# Patient Record
Sex: MALE | Race: WHITE | Employment: FULL TIME | URBAN - NONMETROPOLITAN AREA
[De-identification: names, ages, dates, MRNs, and addresses within clinical notes are randomized per-mention and may not be internally consistent; named-entity substitution may affect disease eponyms.]

---

## 2021-10-16 ENCOUNTER — OFFICE VISIT (OUTPATIENT)
Dept: URGENT CARE | Age: 41
End: 2021-10-16
Payer: COMMERCIAL

## 2021-10-16 VITALS
OXYGEN SATURATION: 99 % | WEIGHT: 190.6 LBS | HEIGHT: 69 IN | HEART RATE: 70 BPM | DIASTOLIC BLOOD PRESSURE: 79 MMHG | BODY MASS INDEX: 28.23 KG/M2 | TEMPERATURE: 98.2 F | SYSTOLIC BLOOD PRESSURE: 129 MMHG

## 2021-10-16 DIAGNOSIS — H00.011 HORDEOLUM OF RIGHT UPPER EYELID, UNSPECIFIED HORDEOLUM TYPE: Primary | ICD-10-CM

## 2021-10-16 PROCEDURE — 99203 OFFICE O/P NEW LOW 30 MIN: CPT | Performed by: NURSE PRACTITIONER

## 2021-10-16 RX ORDER — MULTIVIT-MIN/IRON FUM/FOLIC AC 7.5 MG-4
1 TABLET ORAL DAILY
COMMUNITY
End: 2022-08-22

## 2021-10-16 RX ORDER — CEPHALEXIN 500 MG/1
500 CAPSULE ORAL 3 TIMES DAILY
Qty: 21 CAPSULE | Refills: 0 | Status: SHIPPED | OUTPATIENT
Start: 2021-10-16 | End: 2021-10-23

## 2021-10-16 RX ORDER — MULTIVIT-MIN/IRON/FOLIC ACID/K 18-600-40
CAPSULE ORAL
COMMUNITY
End: 2022-08-22

## 2021-10-16 ASSESSMENT — ENCOUNTER SYMPTOMS
EYE DISCHARGE: 0
EYE PAIN: 1
EYE ITCHING: 0

## 2021-10-16 ASSESSMENT — PATIENT HEALTH QUESTIONNAIRE - PHQ9
SUM OF ALL RESPONSES TO PHQ QUESTIONS 1-9: 0
2. FEELING DOWN, DEPRESSED OR HOPELESS: 0
1. LITTLE INTEREST OR PLEASURE IN DOING THINGS: 0
SUM OF ALL RESPONSES TO PHQ9 QUESTIONS 1 & 2: 0
SUM OF ALL RESPONSES TO PHQ QUESTIONS 1-9: 0
SUM OF ALL RESPONSES TO PHQ QUESTIONS 1-9: 0

## 2021-10-16 NOTE — PATIENT INSTRUCTIONS
Patient Education        Styes and Chalazia: Care Instructions  Your Care Instructions     Styes and chalazia (say \"xcc-JKZ-lct-uh\") are both conditions that can cause swelling of the eyelid. A stye is an infection in the root of an eyelash. The infection causes a tender red lump on the edge of the eyelid. The infection can spread until the whole eyelid becomes red and inflamed. Styes usually break open, and a tiny amount of pus drains. They usually clear up on their own in about a week, but they sometimes need treatment with antibiotics. A chalazion is a lump or cyst in the eyelid (chalazion is singular; chalazia is plural). It is caused by swelling and inflammation of deep oil glands inside the eyelid. Chalazia are usually not infected. They can take a few months to heal.  If a chalazion becomes more swollen and painful or does not go away, you may need to have it drained by your doctor. Follow-up care is a key part of your treatment and safety. Be sure to make and go to all appointments, and call your doctor if you are having problems. It's also a good idea to know your test results and keep a list of the medicines you take. How can you care for yourself at home? · Do not rub your eyes. Do not squeeze or try to open a stye or chalazion. · To help a stye or chalazion heal faster:  ? Put a warm, moist compress on your eye for 5 to 10 minutes, 3 to 6 times a day. Heat often brings a stye to a point where it drains on its own. Keep in mind that warm compresses will often increase swelling a little at first.  ? Do not use hot water or heat a wet cloth in a microwave oven. The compress may get too hot and can burn the eyelid. · Always wash your hands before and after you use a compress or touch your eyes. · If the doctor gave you antibiotic drops or ointment, use the medicine exactly as directed. Use the medicine for as long as instructed, even if your eye starts to feel better.   · To put in eyedrops or ointment:  ? Tilt your head back, and pull your lower eyelid down with one finger. ? Drop or squirt the medicine inside the lower lid. ? Close your eye for 30 to 60 seconds to let the drops or ointment move around. ? Do not touch the ointment or dropper tip to your eyelashes or any other surface. · Do not wear eye makeup or contact lenses until the stye or chalazion heals. · Do not share towels, pillows, or washcloths while you have a stye. When should you call for help? Call your doctor now or seek immediate medical care if:    · You have pain in your eye.     · You have a change in vision or loss of vision.     · Redness and swelling get much worse. Watch closely for changes in your health, and be sure to contact your doctor if:    · Your stye does not get better in 1 week.     · Your chalazion does not start to get better after several weeks. Where can you learn more? Go to https://Star[employer].Petenko. org and sign in to your C[employer] account. Enter J971 in the Infotop box to learn more about \"Styes and Chalazia: Care Instructions. \"     If you do not have an account, please click on the \"Sign Up Now\" link. Current as of: April 29, 2021               Content Version: 13.0  © 6142-3533 Healthwise, Incorporated. Care instructions adapted under license by [facility] ([facility]). If you have questions about a medical condition or this instruction, always ask your healthcare professional. [place] [address] any warranty or liability for your use of this information. 1. Antibiotic as prescribed   2. Apply a warm washcloth several times a day to eye  3. Get established with a PCP - list of providers within [facility]) provided   4. If patient is not improving or developing any new/worsening symptoms then return to clinic as needed.

## 2021-10-16 NOTE — PROGRESS NOTES
[address] [facility]  [address] [facility] [address] [id number]  Dept: [phone]  Dept Fax: [phone]  Loc: [phone]    Jackelyn Shabazz is a 37 y.o. male who presents today for his medical conditions/complaintsas noted below. Jackelyn Shabazz is c/o of Foreign Body in [place] (got something in right eye, is now swollen and irritated )        HPI:     HPI  Andrae Jovel presents today with right upper eye lid irriated. He thinks he may have gotten something it in on Wednesday. He has a history of styes. It has gotten much worse. He tried flushing it in the shower this morning and felt a sharp pain and the more he flushed the pain got better. No fever. No changes in vision. No drainage. He has already tried cool compresses and it has not gotten better. He states historically when he gets spots like these they get better on their own, but his one is not getting better. History reviewed. No pertinent past medical history. History reviewed. No pertinent surgical history. History reviewed. No pertinent family history. Social History     Tobacco Use    Smoking status: Former Smoker     Packs/day: 1.00     Years: 10.00     Pack years: 10.00     Types: Cigarettes     Quit date:      Years since quittin.    Smokeless tobacco: Never Used   Substance Use Topics    Alcohol use: Yes     Comment: occ. Current Outpatient Medications   Medication Sig Dispense Refill    Cholecalciferol (VITAMIN D) 50 MCG (2000) CAPS capsule Take by mouth      Multiple Vitamins-Minerals (MULTIVITAMIN WITH MINERALS) tablet Take 1 tablet by mouth daily      cephALEXin (KEFLEX) 500 MG capsule Take 1 capsule by mouth 3 times daily for 7 days 21 capsule 0     No current facility-administered medications for this visit.      No Known Allergies    Health Maintenance   Topic Date Due    Hepatitis C screen  Never done    Varicella vaccine (1 of 2 - 2-dose childhood series) Never done    COVID-19 Vaccine (1) Never done    HIV screen  Never done    DTaP/Tdap/Td vaccine (1 - Tdap) Never done    Lipid screen  Never done    Diabetes screen  Never done    Flu vaccine (1) Never done    Hepatitis A vaccine  Aged Out    Hepatitis B vaccine  Aged Out    Hib vaccine  Aged Out    Meningococcal (ACWY) vaccine  Aged Out    Pneumococcal 0-64 years Vaccine  Aged Out       Subjective:     Review of Systems   Constitutional: Negative for chills and fever. Eyes: Positive for pain (right upper lid). Negative for discharge, itching and visual disturbance. All other systems reviewed and are negative.      :Objective      Physical Exam  Vitals and nursing note reviewed. Constitutional:       Appearance: Normal appearance. He is not ill-appearing. HENT:      Head: Normocephalic and atraumatic. Eyes:      General:         Right eye: Hordeolum present. Conjunctiva/sclera: Conjunctivae normal.      Right eye: Right conjunctiva is not injected. Pupils: Pupils are equal, round, and reactive to light. Skin:     General: Skin is warm and dry. Neurological:      Mental Status: He is alert and oriented to person, place, and time. Psychiatric:         Mood and Affect: Mood normal.         Behavior: Behavior normal.       /79   Pulse 70   Temp 98.2 °F (36.8 °C)   Ht 5' 9\" (1.753 m)   Wt 190 lb 9.6 oz (86.5 kg)   SpO2 99%   BMI 28.15 kg/m²     :Assessment       Diagnosis Orders   1. Hordeolum of right upper eyelid, unspecified hordeolum type  cephALEXin (KEFLEX) 500 MG capsule       :Plan   1. Antibiotic as prescribed   2. Apply a warm washcloth several times a day to eye  3. Get established with a PCP - list of providers within [facility]) provided   4. If patient is not improving or developing any new/worsening symptoms then return to clinic as needed. No orders of the defined types were placed in this encounter. No follow-ups on file.     Orders Placed This Encounter   Medications  cephALEXin (KEFLEX) 500 MG capsule     Sig: Take 1 capsule by mouth 3 times daily for 7 days     Dispense:  21 capsule     Refill:  0       Patient given educational materials- see patient instructions. Discussed use, benefit, and side effects of prescribedmedications. All patient questions answered. Pt voiced understanding. Patient Instructions       Patient Education        Styes and Chalazia: Care Instructions  Your Care Instructions     Styes and chalazia (say \"xgi-WXA-fjz-\") are both conditions that can cause swelling of the eyelid. A stye is an infection in the root of an eyelash. The infection causes a tender red lump on the edge of the eyelid. The infection can spread until the whole eyelid becomes red and inflamed. Styes usually break open, and a tiny amount of pus drains. They usually clear up on their own in about a week, but they sometimes need treatment with antibiotics. A chalazion is a lump or cyst in the eyelid (chalazion is singular; chalazia is plural). It is caused by swelling and inflammation of deep oil glands inside the eyelid. Chalazia are usually not infected. They can take a few months to heal.  If a chalazion becomes more swollen and painful or does not go away, you may need to have it drained by your doctor. Follow-up care is a key part of your treatment and safety. Be sure to make and go to all appointments, and call your doctor if you are having problems. It's also a good idea to know your test results and keep a list of the medicines you take. How can you care for yourself at home? · Do not rub your eyes. Do not squeeze or try to open a stye or chalazion. · To help a stye or chalazion heal faster:  ? Put a warm, moist compress on your eye for 5 to 10 minutes, 3 to 6 times a day. Heat often brings a stye to a point where it drains on its own.  Keep in mind that warm compresses will often increase swelling a little at first.  ? Do not use hot water or heat a wet cloth in a microwave oven. The compress may get too hot and can burn the eyelid. · Always wash your hands before and after you use a compress or touch your eyes. · If the doctor gave you antibiotic drops or ointment, use the medicine exactly as directed. Use the medicine for as long as instructed, even if your eye starts to feel better. · To put in eyedrops or ointment:  ? Tilt your head back, and pull your lower eyelid down with one finger. ? Drop or squirt the medicine inside the lower lid. ? Close your eye for 30 to 60 seconds to let the drops or ointment move around. ? Do not touch the ointment or dropper tip to your eyelashes or any other surface. · Do not wear eye makeup or contact lenses until the stye or chalazion heals. · Do not share towels, pillows, or washcloths while you have a stye. When should you call for help? Call your doctor now or seek immediate medical care if:    · You have pain in your eye.     · You have a change in vision or loss of vision.     · Redness and swelling get much worse. Watch closely for changes in your health, and be sure to contact your doctor if:    · Your stye does not get better in 1 week.     · Your chalazion does not start to get better after several weeks. Where can you learn more? Go to https://VasSolpedaisyeweb.Nuggeta. org and sign in to your Zursh account. Enter U603 in the [facility] box to learn more about \"Styes and Chalazia: Care Instructions. \"     If you do not have an account, please click on the \"Sign Up Now\" link. Current as of: April 29, 2021               Content Version: 13.0  © 0030-3616 Healthwise, Incorporated. Care instructions adapted under license by [facility] ([facility]). If you have questions about a medical condition or this instruction, always ask your healthcare professional. [address] any warranty or liability for your use of this information. 1. Antibiotic as prescribed   2.  Apply a warm washcloth several times a day to eye  3. Get established with a PCP - list of providers within [facility]) provided   4. If patient is not improving or developing any new/worsening symptoms then return to clinic as needed.          Electronically signed by LAKSHMI Kessler on 10/16/2021 at 12:31 PM

## 2022-08-11 ENCOUNTER — OFFICE VISIT (OUTPATIENT)
Age: 42
End: 2022-08-11
Payer: COMMERCIAL

## 2022-08-11 VITALS
RESPIRATION RATE: 18 BRPM | DIASTOLIC BLOOD PRESSURE: 88 MMHG | WEIGHT: 190.8 LBS | SYSTOLIC BLOOD PRESSURE: 132 MMHG | OXYGEN SATURATION: 97 % | TEMPERATURE: 97.7 F | HEIGHT: 69 IN | HEART RATE: 58 BPM | BODY MASS INDEX: 28.26 KG/M2

## 2022-08-11 DIAGNOSIS — R10.32 LLQ PAIN: Primary | ICD-10-CM

## 2022-08-11 PROCEDURE — 99213 OFFICE O/P EST LOW 20 MIN: CPT

## 2022-08-11 ASSESSMENT — ENCOUNTER SYMPTOMS
ABDOMINAL PAIN: 1
DIARRHEA: 0
CONSTIPATION: 0
BLOOD IN STOOL: 0

## 2022-08-11 NOTE — PROGRESS NOTES
[address]  [address] [facility] [address] [address]  Dept: [phone]  Dept Fax: [phone]  Loc: [phone]    Lennox Carrel is a 37 y.o. male who presents today for his medical conditions/complaints as noted below. Lennox Carrel is c/o of Abdominal Pain (Left lower quadrant pain.) and Other (Pt states that he feels a \"lump\" in his left lower quadrant.)        HPI:     Abdominal Pain  Pertinent negatives include no constipation, diarrhea or fever. Other  Associated symptoms include abdominal pain. Pertinent negatives include no fever. Cherylene Leas presents with complaints of bulge and stabbing pain to left groin that is intermittent for 1 week. He states he is able to push the bulge back in but works 3 jobs and is moving so it is frequent. OTC treatment includes stretches and trying to strengthen core. Denies constipation and blood in his stool. He denies urinary symptoms. The pain is worse when straining for BM or when he has gas. He denies bulging in the scrotum and testicular pain. History reviewed. No pertinent past medical history. No past surgical history on file. No family history on file. Social History     Tobacco Use    Smoking status: Former     Packs/day: 1.00     Years: 10.00     Pack years: 10.00     Types: Cigarettes     Quit date: 2012     Years since quitting: 10.6    Smokeless tobacco: Never   Substance Use Topics    Alcohol use: Yes     Comment: occ. Current Outpatient Medications   Medication Sig Dispense Refill    Cholecalciferol (VITAMIN D) 50 MCG (2000 UT) CAPS capsule Take by mouth (Patient not taking: Reported on 8/11/2022)      Multiple Vitamins-Minerals (MULTIVITAMIN WITH MINERALS) tablet Take 1 tablet by mouth daily (Patient not taking: Reported on 8/11/2022)       No current facility-administered medications for this visit.      No Known Allergies    Health Maintenance   Topic Date Due    COVID-19 Vaccine (1) Never done    Varicella vaccine (1 of 2 - 2-dose childhood series) Never done    HIV screen  Never done    Hepatitis C screen  Never done    DTaP/Tdap/Td vaccine (1 - Tdap) Never done    Diabetes screen  Never done    Lipids  Never done    Flu vaccine (1) 09/01/2022    Depression Screen  10/16/2022    Hepatitis A vaccine  Aged Out    Hepatitis B vaccine  Aged Out    Hib vaccine  Aged Out    Meningococcal (ACWY) vaccine  Aged Out    Pneumococcal 0-64 years Vaccine  Aged Out       Subjective:     Review of Systems   Constitutional:  Negative for fever. Gastrointestinal:  Positive for abdominal pain. Negative for blood in stool, constipation and diarrhea. Genitourinary:  Negative for scrotal swelling.     :Objective      Physical Exam  Constitutional:       General: He is not in acute distress. Appearance: Normal appearance. He is not toxic-appearing. HENT:      Head: Normocephalic and atraumatic. Right Ear: External ear normal.      Left Ear: External ear normal.      Nose: Nose normal.      Mouth/Throat:      Mouth: Mucous membranes are moist.   Eyes:      General:         Right eye: No discharge. Left eye: No discharge. Conjunctiva/sclera: Conjunctivae normal.   Cardiovascular:      Rate and Rhythm: Normal rate and regular rhythm. Pulmonary:      Effort: Pulmonary effort is normal. No respiratory distress. Abdominal:      General: Abdomen is flat. Bowel sounds are normal.      Palpations: Abdomen is soft. Tenderness: There is no abdominal tenderness. Musculoskeletal:         General: Normal range of motion. Cervical back: Normal range of motion. Lymphadenopathy:      Cervical: No cervical adenopathy. Skin:     General: Skin is warm and dry. Capillary Refill: Capillary refill takes less than 2 seconds. Findings: No rash. Neurological:      General: No focal deficit present. Mental Status: He is alert.    Psychiatric:         Mood and Affect: Mood

## 2022-08-11 NOTE — PATIENT INSTRUCTIONS
Avoid straining and lifting anything heavier than 10 lb for the time being  If you develop severe pain, fever or are unable to have BM - go to the ER  Have ultrasound completed tomorrow- results will be called to you  Expect call from general surgery office for appt

## 2022-08-12 ENCOUNTER — HOSPITAL ENCOUNTER (OUTPATIENT)
Dept: ULTRASOUND IMAGING | Age: 42
Discharge: HOME | End: 2022-08-12
Payer: COMMERCIAL

## 2022-08-12 DIAGNOSIS — R10.32 LLQ PAIN: ICD-10-CM

## 2022-08-12 PROCEDURE — 76705 ECHO EXAM OF ABDOMEN: CPT

## 2022-08-12 PROCEDURE — 76705 ECHO EXAM OF ABDOMEN: CPT | Performed by: RADIOLOGY

## 2022-08-15 ENCOUNTER — OFFICE VISIT (OUTPATIENT)
Dept: SURGERY | Age: 42
End: 2022-08-15
Payer: COMMERCIAL

## 2022-08-15 VITALS — HEIGHT: 69 IN | TEMPERATURE: 97.6 F | WEIGHT: 188 LBS | BODY MASS INDEX: 27.85 KG/M2

## 2022-08-15 DIAGNOSIS — K40.90 LEFT INGUINAL HERNIA: Primary | ICD-10-CM

## 2022-08-15 PROCEDURE — 99203 OFFICE O/P NEW LOW 30 MIN: CPT | Performed by: PHYSICIAN ASSISTANT

## 2022-08-15 NOTE — LETTER
Preop Orders:     Patient: Chester Liu  : 1980    Hospital:  [address] [facility]      Admitting Physician:        Diagnosis:   Left Inguinal Hernia    Procedure:  Robotic Left Inguinal Hernia      Anesthesia: General    Admission:Outpatient    Date: next available    Labs:per anesthesia      Pre-Op Meds:  Kefzol 2grm IV      Latex Allergy: no    Beta Blocker: no    Medication Instructions: No plavix for 2 weeks prior to procedure; no asprin for 3 days prior to procedure    This has been electronically signed by PHILLIP Monsalve

## 2022-08-15 NOTE — PROGRESS NOTES
HISTORY OF PRESENT ILLNESS:  Néstor Espinosa III is a 40 y.o. male comes today with a left inguinal hernia. The hernia occurred at work. He has noticed some mild to moderate pain and a questionable bulge. He denies strangulation or incarceration. Current Outpatient Medications   Medication Sig Dispense Refill    Multiple Vitamin (MULTIVITAMIN ADULT) TABS Take 1 tablet by mouth daily      Cholecalciferol (VITAMIN D3) 50 MCG (2000 UT) CAPS Take 1 capsule by mouth daily       No current facility-administered medications for this visit. Allergies: Patient has no known allergies. No past medical history on file. Past Surgical History:   Procedure Laterality Date    TONSILLECTOMY       No family history on file. Social History     Tobacco Use    Smoking status: Former     Packs/day: 1.00     Years: 10.00     Pack years: 10.00     Types: Cigarettes     Quit date: 2012     Years since quitting: 10.6    Smokeless tobacco: Never   Substance Use Topics    Alcohol use: Yes     Comment: occ. Review of Systems  Reviewed and positive for the above all this is noted be negative    Physical Exam  Temperature 97.6 °F (36.4 °C), height 5' 9\" (1.753 m), weight 188 lb (85.3 kg). GENERAL:  Reveals a 40 y.o. male that  appears to be in no acute distress. HEENT:  Head is normocephalic and atraumatic. NECK:  Neck is supple. CHEST:  Patient has normal respiratory effort. Chest is clear bilaterally with good thoracic expansion. HEART:  Heart demonstrated a regular rhythm and rate with no cardiac murmurs, gallops or rubs noted to auscultation. ABDOMEN:  Inspection of the abdomen demonstrated the patient to have normal bowel sounds present. The abdomen is soft and nontender. GROIN:  He has a reducible left inguinal hernia. There is mild tenderness. EXTREMITIES:  Extremities demonstrated no cyanosis or pitting edema bilaterally.     PSYCHIATRIC:  Patient is oriented to time, place and person. The patient's mood and affect are normal.      IMPRESSION:  Left inguinal hernia    PLAN:  The risks, benefits, and options were discussed with the patient. He  is willing to proceed with surgery.

## 2022-08-22 ENCOUNTER — HOSPITAL ENCOUNTER (OUTPATIENT)
Dept: PREADMISSION TESTING | Age: 42
Discharge: HOME | End: 2022-08-26

## 2022-08-22 NOTE — DISCHARGE INSTRUCTIONS
The day before your surgery, you will receive a phone call from the surgery nurse, to let you know what time to arrive on the day of surgery. This call will usually be between 2-4 PM.  If you do not receive a phone call by 4 PM the day before your surgery, please call [phone] and let them know you have not received an arrival time. If your surgery is on Monday, your call will be on the Friday before your Monday surgery. CHLORHEXIDINE GLUCONATE 4% SHOWERING    Patient should shower with this soap the night before surgery and the morning of surgery washing from the neck down (avoiding contact with genitalia). DO NOT [address] YOUR HAIR OR FACE WITH THIS SOAP. When washing with this soap, apply enough to suds up the body thoroughly, turn the water away from your body and allow the soap suds to remain on the body for 2 full minutes, then rinse body completely. After using this soap on the body, please do not apply powders or lotions to your body. After the shower the night before surgery, please dry off with a new towel, sleep in new freshly laundered pj's, and change your bed linen before going to sleep. PREOPERATIVE GUIDELINES WHEN RECEIVING ANESTHESIA    Do not eat or drink anything after midnight, the night before your surgery. This is extremely important for your safety. Take a bath (or shower) the night before your surgery and you may brush your teeth the morning of your surgery. You will be scheduled to arrive at the hospital 2 hours before your surgery, or follow your surgeon's instructions. Dress comfortably. Wear loose clothing that will be easy to remove and comfortable for your trip home. You may wear eyeglasses or contacts but bring your cases with you as they must be remove before your surgery. Hearing aids and dentures will need to be removed before your surgery. Do not wear any jewelry, including body jewelry.   All jewelry will need to be removed prior to your surgery. Do not wear fingernail polish or make-up. It is best not to bring any valuables with you. If you are to stay in the hospital overnight, bring your robe, slippers and personal toiletries that you may need. POSTOPERATIVE GUIDELINES AFTER RECEIVING ANESTHESIA    If you are to go home after your surgery, you will need a responsible adult to drive you home. You will not be able to take public transportation after your discharge from the Operative Care Unit unless you are accompanied by a        responsible adult. On returning home, be sure to follow your physician's orders regarding diet, activity and medications. Remember, surgery with general anesthesia or sedation may leave you sleepy, very tired and with a decreased appetite for 12 to 24 hours. If you develop any post-surgical complications or problems, call your surgeon or [facility] Emergency Department ([phone]). [address] [place] [address] for Surgery Patients-Revised 6-    Visitors for surgery patients are essential for the patient's emotional well-being and care       post operatively. 2.   Visitor Expectations and Limitations        VISITORS MUST WEAR MASKS AT ALL TIMES. NO Cloth masks allowed. 3.  One visitor allowed with patients in the preop/postop rooms. 4.  A second visitor may sit in the waiting area. 5.  No children under 13 allowed in the pre-post op areas unless they are the patient. 6.  Two people may be with an underage surgical/procedural patient in preop/postop        room. 7.  If you are admitted to the hospital post operatively, there are NO RESTRICTIONS on       the floor at this time. 8.  If you are admitted to ICU postoperatively, you may have one visitor in the room from        7A-7P. A second visitor may sit in the ICU waiting room.   There can be no overnight

## 2022-08-25 ENCOUNTER — HOSPITAL ENCOUNTER (OUTPATIENT)
Dept: PREADMISSION TESTING | Age: 42
Discharge: HOME | End: 2022-08-29
Payer: COMMERCIAL

## 2022-08-25 LAB
MRSA SCREEN RT-PCR: NOT DETECTED
SARS-COV-2, PCR: NOT DETECTED

## 2022-08-25 PROCEDURE — U0003 INFECTIOUS AGENT DETECTION BY NUCLEIC ACID (DNA OR RNA); SEVERE ACUTE RESPIRATORY SYNDROME CORONAVIRUS 2 (SARS-COV-2) (CORONAVIRUS DISEASE [COVID-19]), AMPLIFIED PROBE TECHNIQUE, MAKING USE OF HIGH THROUGHPUT TECHNOLOGIES AS DESCRIBED BY CMS-2020-01-R: HCPCS

## 2022-08-25 PROCEDURE — U0005 INFEC AGEN DETEC AMPLI PROBE: HCPCS

## 2022-08-25 PROCEDURE — 87641 MR-STAPH DNA AMP PROBE: CPT

## 2022-09-23 ENCOUNTER — ANESTHESIA EVENT (OUTPATIENT)
Dept: OPERATING ROOM | Age: 42
End: 2022-09-23
Payer: COMMERCIAL

## 2022-09-23 ENCOUNTER — ANESTHESIA (OUTPATIENT)
Dept: OPERATING ROOM | Age: 42
End: 2022-09-23
Payer: COMMERCIAL

## 2022-09-23 ENCOUNTER — HOSPITAL ENCOUNTER (OUTPATIENT)
Age: 42
Setting detail: OUTPATIENT SURGERY
Discharge: HOME | End: 2022-09-23
Attending: SURGERY | Admitting: SURGERY
Payer: COMMERCIAL

## 2022-09-23 VITALS
SYSTOLIC BLOOD PRESSURE: 116 MMHG | TEMPERATURE: 97.2 F | HEART RATE: 47 BPM | RESPIRATION RATE: 16 BRPM | OXYGEN SATURATION: 100 % | WEIGHT: 180 LBS | DIASTOLIC BLOOD PRESSURE: 78 MMHG | BODY MASS INDEX: 26.66 KG/M2 | HEIGHT: 69 IN

## 2022-09-23 DIAGNOSIS — K40.90 INDIRECT LEFT INGUINAL HERNIA: Primary | ICD-10-CM

## 2022-09-23 PROCEDURE — 2500000003 HC RX 250 WO HCPCS

## 2022-09-23 PROCEDURE — 3600000009 HC SURGERY ROBOT BASE: Performed by: SURGERY

## 2022-09-23 PROCEDURE — 7100000011 HC PHASE II RECOVERY - ADDTL 15 MIN: Performed by: SURGERY

## 2022-09-23 PROCEDURE — C1781 MESH (IMPLANTABLE): HCPCS | Performed by: SURGERY

## 2022-09-23 PROCEDURE — 7100000000 HC PACU RECOVERY - FIRST 15 MIN: Performed by: SURGERY

## 2022-09-23 PROCEDURE — 7100000001 HC PACU RECOVERY - ADDTL 15 MIN: Performed by: SURGERY

## 2022-09-23 PROCEDURE — 2500000003 HC RX 250 WO HCPCS: Performed by: SURGERY

## 2022-09-23 PROCEDURE — 2500000003 HC RX 250 WO HCPCS: Performed by: ANESTHESIOLOGY

## 2022-09-23 PROCEDURE — 3700000000 HC ANESTHESIA ATTENDED CARE: Performed by: SURGERY

## 2022-09-23 PROCEDURE — 2709999900 HC NON-CHARGEABLE SUPPLY: Performed by: SURGERY

## 2022-09-23 PROCEDURE — 7100000010 HC PHASE II RECOVERY - FIRST 15 MIN: Performed by: SURGERY

## 2022-09-23 PROCEDURE — C9290 INJ, BUPIVACAINE LIPOSOME: HCPCS | Performed by: ANESTHESIOLOGY

## 2022-09-23 PROCEDURE — 3700000001 HC ADD 15 MINUTES (ANESTHESIA): Performed by: SURGERY

## 2022-09-23 PROCEDURE — 6360000002 HC RX W HCPCS: Performed by: SURGERY

## 2022-09-23 PROCEDURE — 6370000000 HC RX 637 (ALT 250 FOR IP): Performed by: SURGERY

## 2022-09-23 PROCEDURE — 2580000003 HC RX 258: Performed by: SURGERY

## 2022-09-23 PROCEDURE — 2580000003 HC RX 258

## 2022-09-23 PROCEDURE — 6360000002 HC RX W HCPCS: Performed by: ANESTHESIOLOGY

## 2022-09-23 PROCEDURE — 3600000019 HC SURGERY ROBOT ADDTL 15MIN: Performed by: SURGERY

## 2022-09-23 PROCEDURE — 6360000002 HC RX W HCPCS

## 2022-09-23 PROCEDURE — S2900 ROBOTIC SURGICAL SYSTEM: HCPCS | Performed by: SURGERY

## 2022-09-23 PROCEDURE — 49650 LAP ING HERNIA REPAIR INIT: CPT | Performed by: SURGERY

## 2022-09-23 PROCEDURE — 64486 TAP BLOCK UNIL BY INJECTION: CPT

## 2022-09-23 DEVICE — MESH CS LEFT LARGE 10CM X 16CM: Type: IMPLANTABLE DEVICE | Site: GROIN | Status: FUNCTIONAL

## 2022-09-23 RX ORDER — SODIUM CHLORIDE 9 MG/ML
INJECTION, SOLUTION INTRAVENOUS PRN
Status: DISCONTINUED | OUTPATIENT
Start: 2022-09-23 | End: 2022-09-23 | Stop reason: HOSPADM

## 2022-09-23 RX ORDER — BUPIVACAINE HYDROCHLORIDE 2.5 MG/ML
INJECTION, SOLUTION EPIDURAL; INFILTRATION; INTRACAUDAL
Status: COMPLETED | OUTPATIENT
Start: 2022-09-23 | End: 2022-09-23

## 2022-09-23 RX ORDER — HYDROMORPHONE HYDROCHLORIDE 1 MG/ML
0.25 INJECTION, SOLUTION INTRAMUSCULAR; INTRAVENOUS; SUBCUTANEOUS EVERY 5 MIN PRN
Status: DISCONTINUED | OUTPATIENT
Start: 2022-09-23 | End: 2022-09-23 | Stop reason: HOSPADM

## 2022-09-23 RX ORDER — DEXAMETHASONE SODIUM PHOSPHATE 10 MG/ML
INJECTION, SOLUTION INTRAMUSCULAR; INTRAVENOUS PRN
Status: DISCONTINUED | OUTPATIENT
Start: 2022-09-23 | End: 2022-09-23 | Stop reason: SDUPTHER

## 2022-09-23 RX ORDER — ROCURONIUM BROMIDE 10 MG/ML
INJECTION, SOLUTION INTRAVENOUS PRN
Status: DISCONTINUED | OUTPATIENT
Start: 2022-09-23 | End: 2022-09-23 | Stop reason: SDUPTHER

## 2022-09-23 RX ORDER — DIPHENHYDRAMINE HYDROCHLORIDE 50 MG/ML
12.5 INJECTION INTRAMUSCULAR; INTRAVENOUS
Status: DISCONTINUED | OUTPATIENT
Start: 2022-09-23 | End: 2022-09-23 | Stop reason: HOSPADM

## 2022-09-23 RX ORDER — LIDOCAINE HYDROCHLORIDE 10 MG/ML
INJECTION, SOLUTION INFILTRATION; PERINEURAL
Status: COMPLETED | OUTPATIENT
Start: 2022-09-23 | End: 2022-09-23

## 2022-09-23 RX ORDER — PROPOFOL 10 MG/ML
INJECTION, EMULSION INTRAVENOUS PRN
Status: DISCONTINUED | OUTPATIENT
Start: 2022-09-23 | End: 2022-09-23 | Stop reason: SDUPTHER

## 2022-09-23 RX ORDER — FENTANYL CITRATE 50 UG/ML
INJECTION, SOLUTION INTRAMUSCULAR; INTRAVENOUS PRN
Status: DISCONTINUED | OUTPATIENT
Start: 2022-09-23 | End: 2022-09-23 | Stop reason: SDUPTHER

## 2022-09-23 RX ORDER — SODIUM CHLORIDE 0.9 % (FLUSH) 0.9 %
5-40 SYRINGE (ML) INJECTION PRN
Status: DISCONTINUED | OUTPATIENT
Start: 2022-09-23 | End: 2022-09-23 | Stop reason: HOSPADM

## 2022-09-23 RX ORDER — BUPIVACAINE HYDROCHLORIDE 2.5 MG/ML
INJECTION, SOLUTION EPIDURAL; INFILTRATION; INTRACAUDAL
Status: COMPLETED
Start: 2022-09-23 | End: 2022-09-23

## 2022-09-23 RX ORDER — BUPIVACAINE HYDROCHLORIDE 2.5 MG/ML
INJECTION, SOLUTION INFILTRATION; PERINEURAL PRN
Status: DISCONTINUED | OUTPATIENT
Start: 2022-09-23 | End: 2022-09-23 | Stop reason: ALTCHOICE

## 2022-09-23 RX ORDER — LIDOCAINE HYDROCHLORIDE 10 MG/ML
INJECTION, SOLUTION EPIDURAL; INFILTRATION; INTRACAUDAL; PERINEURAL PRN
Status: DISCONTINUED | OUTPATIENT
Start: 2022-09-23 | End: 2022-09-23 | Stop reason: SDUPTHER

## 2022-09-23 RX ORDER — METOCLOPRAMIDE HYDROCHLORIDE 5 MG/ML
10 INJECTION INTRAMUSCULAR; INTRAVENOUS
Status: DISCONTINUED | OUTPATIENT
Start: 2022-09-23 | End: 2022-09-23 | Stop reason: HOSPADM

## 2022-09-23 RX ORDER — MIDAZOLAM HYDROCHLORIDE 2 MG/2ML
2 INJECTION, SOLUTION INTRAMUSCULAR; INTRAVENOUS
Status: COMPLETED | OUTPATIENT
Start: 2022-09-23 | End: 2022-09-23

## 2022-09-23 RX ORDER — OXYCODONE HYDROCHLORIDE AND ACETAMINOPHEN 5; 325 MG/1; MG/1
1 TABLET ORAL EVERY 6 HOURS PRN
Qty: 12 TABLET | Refills: 0 | Status: SHIPPED | OUTPATIENT
Start: 2022-09-23 | End: 2022-09-26

## 2022-09-23 RX ORDER — FAMOTIDINE 20 MG/1
20 TABLET, FILM COATED ORAL ONCE
Status: DISCONTINUED | OUTPATIENT
Start: 2022-09-23 | End: 2022-09-23 | Stop reason: HOSPADM

## 2022-09-23 RX ORDER — SODIUM CHLORIDE 0.9 % (FLUSH) 0.9 %
5-40 SYRINGE (ML) INJECTION EVERY 12 HOURS SCHEDULED
Status: DISCONTINUED | OUTPATIENT
Start: 2022-09-23 | End: 2022-09-23 | Stop reason: HOSPADM

## 2022-09-23 RX ORDER — ONDANSETRON 2 MG/ML
INJECTION INTRAMUSCULAR; INTRAVENOUS PRN
Status: DISCONTINUED | OUTPATIENT
Start: 2022-09-23 | End: 2022-09-23 | Stop reason: SDUPTHER

## 2022-09-23 RX ORDER — HYDROMORPHONE HYDROCHLORIDE 1 MG/ML
0.5 INJECTION, SOLUTION INTRAMUSCULAR; INTRAVENOUS; SUBCUTANEOUS EVERY 5 MIN PRN
Status: DISCONTINUED | OUTPATIENT
Start: 2022-09-23 | End: 2022-09-23 | Stop reason: HOSPADM

## 2022-09-23 RX ORDER — LIDOCAINE HYDROCHLORIDE 10 MG/ML
1 INJECTION, SOLUTION EPIDURAL; INFILTRATION; INTRACAUDAL; PERINEURAL
Status: DISCONTINUED | OUTPATIENT
Start: 2022-09-23 | End: 2022-09-23 | Stop reason: HOSPADM

## 2022-09-23 RX ORDER — SODIUM CHLORIDE, SODIUM LACTATE, POTASSIUM CHLORIDE, CALCIUM CHLORIDE 600; 310; 30; 20 MG/100ML; MG/100ML; MG/100ML; MG/100ML
INJECTION, SOLUTION INTRAVENOUS CONTINUOUS PRN
Status: DISCONTINUED | OUTPATIENT
Start: 2022-09-23 | End: 2022-09-23 | Stop reason: SDUPTHER

## 2022-09-23 RX ORDER — MEPERIDINE HYDROCHLORIDE 25 MG/ML
12.5 INJECTION INTRAMUSCULAR; INTRAVENOUS; SUBCUTANEOUS EVERY 5 MIN PRN
Status: DISCONTINUED | OUTPATIENT
Start: 2022-09-23 | End: 2022-09-23 | Stop reason: HOSPADM

## 2022-09-23 RX ORDER — SODIUM CHLORIDE, SODIUM LACTATE, POTASSIUM CHLORIDE, CALCIUM CHLORIDE 600; 310; 30; 20 MG/100ML; MG/100ML; MG/100ML; MG/100ML
INJECTION, SOLUTION INTRAVENOUS CONTINUOUS
Status: DISCONTINUED | OUTPATIENT
Start: 2022-09-23 | End: 2022-09-23 | Stop reason: HOSPADM

## 2022-09-23 RX ORDER — OXYCODONE HYDROCHLORIDE AND ACETAMINOPHEN 5; 325 MG/1; MG/1
1 TABLET ORAL
Status: COMPLETED | OUTPATIENT
Start: 2022-09-23 | End: 2022-09-23

## 2022-09-23 RX ORDER — SCOLOPAMINE TRANSDERMAL SYSTEM 1 MG/1
1 PATCH, EXTENDED RELEASE TRANSDERMAL
Status: DISCONTINUED | OUTPATIENT
Start: 2022-09-23 | End: 2022-09-23 | Stop reason: HOSPADM

## 2022-09-23 RX ADMIN — SODIUM CHLORIDE, SODIUM LACTATE, POTASSIUM CHLORIDE, AND CALCIUM CHLORIDE: 600; 310; 30; 20 INJECTION, SOLUTION INTRAVENOUS at 10:58

## 2022-09-23 RX ADMIN — FENTANYL CITRATE 100 MCG: 50 INJECTION, SOLUTION INTRAMUSCULAR; INTRAVENOUS at 11:01

## 2022-09-23 RX ADMIN — SUGAMMADEX 100 MG: 100 INJECTION, SOLUTION INTRAVENOUS at 12:21

## 2022-09-23 RX ADMIN — OXYCODONE HYDROCHLORIDE AND ACETAMINOPHEN 1 TABLET: 5; 325 TABLET ORAL at 13:43

## 2022-09-23 RX ADMIN — LIDOCAINE HYDROCHLORIDE 5 ML: 10 INJECTION, SOLUTION EPIDURAL; INFILTRATION; INTRACAUDAL; PERINEURAL at 11:01

## 2022-09-23 RX ADMIN — FENTANYL CITRATE 50 MCG: 50 INJECTION, SOLUTION INTRAMUSCULAR; INTRAVENOUS at 11:19

## 2022-09-23 RX ADMIN — BUPIVACAINE HYDROCHLORIDE 10 ML: 2.5 INJECTION, SOLUTION EPIDURAL; INFILTRATION; INTRACAUDAL; PERINEURAL at 10:54

## 2022-09-23 RX ADMIN — LIDOCAINE HYDROCHLORIDE 1 ML: 10 INJECTION, SOLUTION INFILTRATION; PERINEURAL at 10:54

## 2022-09-23 RX ADMIN — BUPIVACAINE 20 ML: 13.3 INJECTION, SUSPENSION, LIPOSOMAL INFILTRATION at 10:54

## 2022-09-23 RX ADMIN — SODIUM CHLORIDE, SODIUM LACTATE, POTASSIUM CHLORIDE, AND CALCIUM CHLORIDE: 600; 310; 30; 20 INJECTION, SOLUTION INTRAVENOUS at 12:22

## 2022-09-23 RX ADMIN — ROCURONIUM BROMIDE 30 MG: 10 INJECTION, SOLUTION INTRAVENOUS at 11:12

## 2022-09-23 RX ADMIN — DEXAMETHASONE SODIUM PHOSPHATE 10 MG: 10 INJECTION, SOLUTION INTRAMUSCULAR; INTRAVENOUS at 11:01

## 2022-09-23 RX ADMIN — ONDANSETRON 4 MG: 2 INJECTION INTRAMUSCULAR; INTRAVENOUS at 12:22

## 2022-09-23 RX ADMIN — SUGAMMADEX 100 MG: 100 INJECTION, SOLUTION INTRAVENOUS at 12:22

## 2022-09-23 RX ADMIN — FENTANYL CITRATE 50 MCG: 50 INJECTION, SOLUTION INTRAMUSCULAR; INTRAVENOUS at 12:20

## 2022-09-23 RX ADMIN — MIDAZOLAM HYDROCHLORIDE 2 MG: 2 INJECTION, SOLUTION INTRAMUSCULAR; INTRAVENOUS at 10:43

## 2022-09-23 RX ADMIN — ROCURONIUM BROMIDE 50 MG: 10 INJECTION, SOLUTION INTRAVENOUS at 11:01

## 2022-09-23 RX ADMIN — WATER 2000 MG: 1 INJECTION INTRAMUSCULAR; INTRAVENOUS; SUBCUTANEOUS at 11:12

## 2022-09-23 RX ADMIN — PROPOFOL 150 MG: 10 INJECTION, EMULSION INTRAVENOUS at 11:01

## 2022-09-23 ASSESSMENT — PAIN DESCRIPTION - ONSET
ONSET: ON-GOING
ONSET: ON-GOING

## 2022-09-23 ASSESSMENT — PAIN SCALES - GENERAL
PAINLEVEL_OUTOF10: 5
PAINLEVEL_OUTOF10: 5

## 2022-09-23 ASSESSMENT — PAIN DESCRIPTION - FREQUENCY
FREQUENCY: CONTINUOUS
FREQUENCY: CONTINUOUS

## 2022-09-23 ASSESSMENT — LIFESTYLE VARIABLES: SMOKING_STATUS: 0

## 2022-09-23 ASSESSMENT — PAIN DESCRIPTION - DESCRIPTORS
DESCRIPTORS: PRESSURE
DESCRIPTORS: PRESSURE

## 2022-09-23 ASSESSMENT — PAIN DESCRIPTION - LOCATION
LOCATION: ABDOMEN
LOCATION: ABDOMEN

## 2022-09-23 ASSESSMENT — PAIN DESCRIPTION - PAIN TYPE
TYPE: SURGICAL PAIN
TYPE: SURGICAL PAIN

## 2022-09-23 ASSESSMENT — PAIN - FUNCTIONAL ASSESSMENT: PAIN_FUNCTIONAL_ASSESSMENT: NONE - DENIES PAIN

## 2022-09-23 NOTE — ANESTHESIA PRE PROCEDURE
Department of Anesthesiology  Preprocedure Note       Name:  Néstor Espinosa III   Age:  40 y.o.  :  1980                                          MRN:  [id number]         Date:  2022      Surgeon: Quinton Fenton):  Mushtaq Lujan DO    Procedure: Procedure(s):  ROBOTIC LEFT INGUINAL HERNIA REPAIR    Medications prior to admission:   Prior to Admission medications    Medication Sig Start Date End Date Taking? Authorizing Provider   Multiple Vitamin (MULTIVITAMIN ADULT) TABS Take 1 tablet by mouth daily    Historical Provider, MD   Cholecalciferol (VITAMIN D3) 50 MCG ( UT) CAPS Take 1 capsule by mouth daily    Historical Provider, MD       Current medications:    Current Facility-Administered Medications   Medication Dose Route Frequency Provider Last Rate Last Admin    ceFAZolin (ANCEF) 2,000 mg in sterile water 20 mL IV syringe  2,000 mg IntraVENous Once Maritza Alvarenga DO           Allergies:  No Known Allergies    Problem List:  There is no problem list on file for this patient. Past Medical History:  History reviewed. No pertinent past medical history. Past Surgical History:        Procedure Laterality Date    TONSILLECTOMY         Social History:    Social History     Tobacco Use    Smoking status: Former     Packs/day: 1.00     Years: 10.00     Pack years: 10.00     Types: Cigarettes     Quit date:      Years since quitting: 10.    Smokeless tobacco: Never   Substance Use Topics    Alcohol use: Yes     Comment: occ.                                 Counseling given: Not Answered      Vital Signs (Current):   Vitals:    22 1008   BP: 115/75   Pulse: 52   Resp: 18   Temp: 98.5 °F (36.9 °C)   TempSrc: Tympanic   SpO2: 98%   Weight: 180 lb (81.6 kg)   Height: 5' 9\" (1.753 m)                                              BP Readings from Last 3 Encounters:   22 115/75   22 132/88   10/16/21 129/79       NPO Status: Time of last liquid consumption: 2330 administered. Anesthetic plan and risks discussed with patient and spouse.                         Cherry Miles MD   9/23/2022

## 2022-09-23 NOTE — OP NOTE
Operative Note      Patient: Nicole Lynch III  YOB: 1980  MRN: [id number]    Date of Procedure: 9/23/2022    Pre-Op Diagnosis: Unilateral inguinal hernia without obstruction or gangrene, recurrence not specified [K40.90]    Post-Op Diagnosis:  Left Indirect Inguinal Hernia without obstruction. Procedure(s):  ROBOTIC LEFT INGUINAL HERNIA REPAIR    Surgeon(s):  Emil Conway DO    Assistant:   First Assistant: Veronica Vera RN    Anesthesia: General    Estimated Blood Loss (mL): Minimal    Complications: None    Specimens:   * No specimens in log *    Implants:  Implant Name Type Inv. Item Serial No.  Lot No. LRB No. Used Action   MESH CS LEFT LARGE 10CM X 16CM - [id number]  MESH CS LEFT LARGE 10CM X 16CM  BARD DAVOL-WD [id number] Left 1 Implanted         Drains:   Urinary Catheter 09/23/22 Col[place] (Active)       Findings: Left indirect inguinal hernia. Detailed Description of Procedure:   Mr. Alyce Stone was taken to the main operating room, placed on the operating  table supine. After the induction of adequate general endotracheal anesthesia, the abdomen was prepped and draped to a sterile field. Timeout was performed identifying correct equipment, preoperative antibiotics, and procedure. A small incision was made superior to the umbilicus, 94BI from the ilioinguinal ligament after administration of local anesthetic into the subcutaneous tissue. This was carried down to the fascia and a size 8 robotic trocar was inserted. The abdomen was insuflatted and the patient tolerated insuflation well. The laparoscope was advanced and inspection undertaken identifying no injury from initial trocar insertion. Two working ports were then placed under direct vision. Each were 8 mm robotic trocars, both placed 8cm lateral to the umbilical port. Upon inspection, he was found to have a large left indirect inguinal hernia. The [employer] operating System was delivered onto the field.   Working instruments were advanced and the left groin was approached. The lateral peritoneum was grasped and incised toward thepubic tubercle. Blunt dissection was performed and the pubic tubercle was identified and Isaias's ligament was then cleared down to the obturator vein. Direct space was inspected without evidence of herniation. The filmy attachments on the lateral aspect of the cord were dissected and the cord structures were identified. The sac was elevated from the underlying cord structures and dissected well back in the peritoneum. The selected mesh was an Bard 3d Max, Left, Size large 10 x 16 cm which was delivered onto the field, opened and oriented across the left groin in standard fashion. When satisfied with the positioning, the mesh was secured in place with the 2-0 vicryl suture with a suture placed at Isaias's Ligament and an additional suture placed superiorly, lateral to the epigastric vessels. This left the mesh in excellent position with the direct and indirect spaces nicely covered. The peritoneum was then approximated and closed with the Stratifix 6 inch suture, again, taking care to avoid the epigastric vessels and to cover the mesh entirely. The operative site was then checked and hemostasis assured. The working ports were then removed. No bleeding noted. The structural port  was deflated and removed as well. Fascia at the umbilical incision was  closed with one figure-of-eight 0 Vicryl suture. Skin incisions were closed  with interrupted 4-0 monocryl suture, followed by Dermabond dressing. Sponge, needle and instrument count correct on 2 occasions. Estimated intraoperative blood loss minimal.     Mr. Fernando Jason tolerated his surgery well, and he was taken to recovery in satisfactory condition.            ________________________________  Lara Beck DO      Electronically signed by Lara Beck DO on 4/20/3954 at 12:18 PM

## 2022-09-23 NOTE — ANESTHESIA PROCEDURE NOTES
Peripheral Block    Patient location during procedure: holding area  Reason for block: post-op pain management and at surgeon's request  Start time: 9/23/2022 10:54 AM  Staffing  Performed: anesthesiologist   Anesthesiologist: Maximino Dumont MD  Preanesthetic Checklist  Completed: patient identified, IV checked, site marked, risks and benefits discussed, surgical/procedural consents, equipment checked, pre-op evaluation, timeout performed, anesthesia consent given, oxygen available, monitors applied/VS acknowledged, fire risk safety assessment completed and verbalized and blood product R/B/A discussed and consented  Peripheral Block   Patient position: supine  Prep: ChloraPrep  Provider prep: mask and sterile gloves  Patient monitoring: cardiac monitor, continuous pulse ox, frequent blood pressure checks, IV access and responsive to questions  Block type: TAP  Laterality: left  Injection technique: single-shot  Guidance: ultrasound guided  Local infiltration: lidocaine  Local infiltration: lidocaine    Needle   Needle type: insulated echogenic nerve stimulator needle   Needle gauge: 22 G  Needle localization: ultrasound guidance  Needle length: 10 cm  Assessment   Injection assessment: negative aspiration for heme, no paresthesia on injection and local visualized surrounding nerve on ultrasound  Paresthesia pain: none  Slow fractionated injection: yes  Hemodynamics: stable  Real-time US image taken/store: yes  Outcomes: patient tolerated procedure well and uncomplicated    Medications Administered  lidocaine 1 % - Subcuticular   1 mL - 9/23/2022 10:54:00 AM  bupivacaine (PF) 0.25 % - Perineural   10 mL - 9/23/2022 10:54:00 AM  bupivacaine liposome 1.3 % - Perineural   20 mL - 9/23/2022 10:54:00 AM

## 2022-09-23 NOTE — H&P
[facility] Surgery History and Physical   HISTORY OF PRESENT ILLNESS:  Shannon Little III is a 40 y.o. male comes today with a left inguinal hernia. The hernia occurred at work. He has noticed some mild to moderate pain and a questionable bulge. He denies strangulation or incarceration. Current Facility-Administered Medications          Current Outpatient Medications   Medication Sig Dispense Refill    Multiple Vitamin (MULTIVITAMIN ADULT) TABS Take 1 tablet by mouth daily        Cholecalciferol (VITAMIN D3) 50 MCG (2000 UT) CAPS Take 1 capsule by mouth daily          No current facility-administered medications for this visit. Allergies: Patient has no known allergies. Past Medical History   No past medical history on file. Past Surgical History         Past Surgical History:   Procedure Laterality Date    TONSILLECTOMY             Family History   No family history on file. Social History            Tobacco Use    Smoking status: Former       Packs/day: 1.00       Years: 10.00       Pack years: 10.00       Types: Cigarettes       Quit date: 2012       Years since quitting: 10.6    Smokeless tobacco: Never   Substance Use Topics    Alcohol use: Yes       Comment: occ. Review of Systems  Reviewed and positive for the above all this is noted be negative     Physical Exam  Temperature 97.6 °F (36.4 °C), height 5' 9\" (1.753 m), weight 188 lb (85.3 kg). GENERAL:  Reveals a 40 y.o. male that  appears to be in no acute distress. HEENT:  Head is normocephalic and atraumatic. NECK:  Neck is supple. CHEST:  Patient has normal respiratory effort. Chest is clear bilaterally with good thoracic expansion. HEART:  Heart demonstrated a regular rhythm and rate with no cardiac murmurs, gallops or rubs noted to auscultation. ABDOMEN:  Inspection of the abdomen demonstrated the patient to have normal bowel sounds present. The abdomen is soft and nontender. GROIN:  He has a reducible left inguinal hernia. There is mild tenderness. EXTREMITIES:  Extremities demonstrated no cyanosis or pitting edema bilaterally. PSYCHIATRIC:  Patient is oriented to time, place and person. The patient's mood and affect are normal.      The risks, benefits, and alternatives were discussed with the pt. He is willing to accept the risks and proceed with a robotic left inguinal hernia repair with mesh, possible bilateral. The surgical risks included but not limited to bleeding, infection, perforation, recurrence, risk of needing further surgery, etc. The anesthetic risks included heart attacks, strokes, pneumonia, phlebitis, etc.  He is willing to accept all risks and proceed with surgery. No guarantees have been given.     DO Gricel   90/30/55  10:38 AM

## 2022-09-23 NOTE — ANESTHESIA POSTPROCEDURE EVALUATION
Department of Anesthesiology  Postprocedure Note    Patient: Juan Luis Martin  MRN: [id number]  YOB: 1980  Date of evaluation: 9/23/2022      Procedure Summary     Date: 09/23/22 Room / Location: [address]    Anesthesia Start: 1429 Anesthesia Stop: 8443    Procedure: ROBOTIC LEFT INGUINAL HERNIA REPAIR (Left: Groin) Diagnosis:       Unilateral inguinal hernia without obstruction or gangrene, recurrence not specified      (Unilateral inguinal hernia without obstruction or gangrene, recurrence not specified [W40.28])    Surgeons: Alfredo Tracey DO Responsible Provider: LAKSHMI Roberts CRNA    Anesthesia Type: general ASA Status: 2          Anesthesia Type: No value filed.     Moni Phase I: Moni Score: 10    Moni Phase II:        Anesthesia Post Evaluation    Patient location during evaluation: PACU  Patient participation: waiting for patient participation  Level of consciousness: responsive to painful stimuli  Pain score: 0  Airway patency: patent  Nausea & Vomiting: no nausea and no vomiting  Complications: no  Cardiovascular status: blood pressure returned to baseline  Respiratory status: acceptable and room air  Hydration status: euvolemic

## 2022-10-06 ENCOUNTER — OFFICE VISIT (OUTPATIENT)
Dept: SURGERY | Age: 42
End: 2022-10-06

## 2022-10-06 VITALS
OXYGEN SATURATION: 97 % | BODY MASS INDEX: 26.66 KG/M2 | WEIGHT: 180 LBS | HEIGHT: 69 IN | HEART RATE: 73 BPM | TEMPERATURE: 98.1 F

## 2022-10-06 DIAGNOSIS — Z09 POSTOPERATIVE EXAMINATION: Primary | ICD-10-CM

## 2022-10-06 PROCEDURE — 99024 POSTOP FOLLOW-UP VISIT: CPT | Performed by: SURGERY

## 2022-10-06 RX ORDER — LORATADINE 10 MG/1
10 TABLET ORAL DAILY
COMMUNITY

## 2022-10-06 NOTE — PROGRESS NOTES
Postop Progress Note    Subjective    Gillian Henderson III presents to the office for postop follow up 2 weeks s/p left inguinal hernia repair with mesh. He notes he will get sore when he is up and about but then lays down to rest and it improves. Objective    Vitals:    10/06/22 1102   Pulse: 73   Temp: 98.1 °F (36.7 °C)   SpO2: 97%     General: alert, cooperative and no distress  Incision: healing well. No hernia with valsalva. Assessment  Doing well postoperatively. Plan  1. Continue any current medications  2. Wound care discussed  3. Pt is to increase activities as tolerated, maintain light duty for another month. May return to work 10/17 with light duty. 4. Usual diet  5. Follow up: one month.     Electronically signed by Lindy Carey DO on 52/5/1729 at 11:15 AM

## 2022-11-03 ENCOUNTER — OFFICE VISIT (OUTPATIENT)
Dept: SURGERY | Age: 42
End: 2022-11-03

## 2022-11-03 VITALS
BODY MASS INDEX: 28.02 KG/M2 | DIASTOLIC BLOOD PRESSURE: 78 MMHG | TEMPERATURE: 97.8 F | HEIGHT: 69 IN | WEIGHT: 189.2 LBS | SYSTOLIC BLOOD PRESSURE: 120 MMHG

## 2022-11-03 DIAGNOSIS — Z09 POSTOPERATIVE EXAMINATION: Primary | ICD-10-CM

## 2022-11-03 PROCEDURE — 99024 POSTOP FOLLOW-UP VISIT: CPT | Performed by: SURGERY

## 2022-11-03 NOTE — PROGRESS NOTES
Postop Progress Note    Subjective    Bisi Meade presents to the office for postop follow up 6 weeks s/p robotic left inguinal hernia repair with mesh. He notes minimal pain now, but does have a little soreness with his morning stretches. Objective    Vitals:    11/03/22 1531   BP: 120/78   Temp: 97.8 °F (36.6 °C)     General: alert, cooperative and no distress  Incision: healing well. No hernia with valsalva to the left groin. Assessment  Doing well postoperatively. Plan  1. Continue any current medications  2. Wound care discussed  3. Pt is to increase activities as tolerated  4. Usual diet  5.  Follow up: as needed    Electronically signed by Elias Giordano DO on 57/5/3154 at 3:46 PM

## 2023-01-06 ENCOUNTER — OFFICE VISIT (OUTPATIENT)
Dept: PRIMARY CARE CLINIC | Age: 43
End: 2023-01-06

## 2023-01-06 VITALS
HEIGHT: 69 IN | OXYGEN SATURATION: 97 % | DIASTOLIC BLOOD PRESSURE: 78 MMHG | SYSTOLIC BLOOD PRESSURE: 110 MMHG | TEMPERATURE: 97.2 F | BODY MASS INDEX: 29.38 KG/M2 | WEIGHT: 198.4 LBS | HEART RATE: 65 BPM

## 2023-01-06 DIAGNOSIS — Z23 FLU VACCINE NEED: ICD-10-CM

## 2023-01-06 DIAGNOSIS — Z76.89 ENCOUNTER TO ESTABLISH CARE: Primary | ICD-10-CM

## 2023-01-06 DIAGNOSIS — Z01.89 ROUTINE LAB DRAW: ICD-10-CM

## 2023-01-06 SDOH — ECONOMIC STABILITY: FOOD INSECURITY: WITHIN THE PAST 12 MONTHS, YOU WORRIED THAT YOUR FOOD WOULD RUN OUT BEFORE YOU GOT MONEY TO BUY MORE.: NEVER TRUE

## 2023-01-06 SDOH — ECONOMIC STABILITY: FOOD INSECURITY: WITHIN THE PAST 12 MONTHS, THE FOOD YOU BOUGHT JUST DIDN'T LAST AND YOU DIDN'T HAVE MONEY TO GET MORE.: NEVER TRUE

## 2023-01-06 ASSESSMENT — ENCOUNTER SYMPTOMS
DIARRHEA: 0
CHEST TIGHTNESS: 0
ABDOMINAL PAIN: 0
SORE THROAT: 0
COUGH: 0
CONSTIPATION: 1
VOMITING: 0
WHEEZING: 0
BACK PAIN: 0
SHORTNESS OF BREATH: 0
EYE PAIN: 0
NAUSEA: 0
TROUBLE SWALLOWING: 0

## 2023-01-06 ASSESSMENT — PATIENT HEALTH QUESTIONNAIRE - PHQ9
SUM OF ALL RESPONSES TO PHQ9 QUESTIONS 1 & 2: 0
SUM OF ALL RESPONSES TO PHQ QUESTIONS 1-9: 0
1. LITTLE INTEREST OR PLEASURE IN DOING THINGS: 0
SUM OF ALL RESPONSES TO PHQ QUESTIONS 1-9: 0
2. FEELING DOWN, DEPRESSED OR HOPELESS: 0

## 2023-01-06 ASSESSMENT — SOCIAL DETERMINANTS OF HEALTH (SDOH): HOW HARD IS IT FOR YOU TO PAY FOR THE VERY BASICS LIKE FOOD, HOUSING, MEDICAL CARE, AND HEATING?: NOT HARD AT ALL

## 2023-01-06 NOTE — PROGRESS NOTES
[facility]  [address] [address] [address]  Dept: [phone]  Dept Fax: [phone]  Loc: [phone]      Subjective:     Chief Complaint   Patient presents with    [facility]       HPI:  Bola Yoder is a 40 y.o. male presents today as a new patient. He did not have any PCP for a long time. He was busy taking care of her 9 yr old daughter who now has full custody of. He and GF is also expecting a new baby soon. PMHx unremarkable. He states that he has always been in good health. He seldom got sick and when he does get sick, he went to the . He is not on any chronic medications. He is behind on some of his vaccines but he agreed to receive flu vaccine today. ROS:   Review of Systems   Constitutional:  Negative for appetite change, chills, fatigue and fever. HENT:  Negative for congestion, ear pain, hearing loss, nosebleeds, sore throat and trouble swallowing. Eyes:  Negative for pain and visual disturbance. Respiratory:  Negative for cough, chest tightness, shortness of breath and wheezing. Cardiovascular:  Negative for chest pain, palpitations and leg swelling. Gastrointestinal:  Positive for constipation (occ - takes Metamucil). Negative for abdominal pain, diarrhea, nausea and vomiting. Endocrine: Negative for cold intolerance, heat intolerance, polydipsia, polyphagia and polyuria. Genitourinary:  Negative for difficulty urinating, dysuria, frequency, hematuria and urgency. Musculoskeletal:  Negative for arthralgias, back pain, gait problem, joint swelling, myalgias, neck pain and neck stiffness. Skin:  Negative for pallor and rash. Allergic/Immunologic: Negative for environmental allergies and food allergies. Neurological:  Negative for dizziness, weakness and numbness. Hematological:  Negative for adenopathy. Does not bruise/bleed easily.    Psychiatric/Behavioral:  Negative for agitation, behavioral problems, decreased concentration and sleep disturbance. The patient is not nervous/anxious. PMHx:  Past Medical History:   Diagnosis Date    Allergic rhinitis      There is no problem list on file for this patient. PSHx:  Past Surgical History:   Procedure Laterality Date    HERNIA REPAIR Left 2022    ROBOTIC LEFT INGUINAL HERNIA REPAIR performed by Dewitt Cowden, DO at [address] [address]         PFHx:  No family history on file. SocialHx:  Social History     Tobacco Use    Smoking status: Former     Packs/day: 1.00     Years: 10.00     Pack years: 10.00     Types: Cigarettes     Quit date:      Years since quittin.0    Smokeless tobacco: Never   Substance Use Topics    Alcohol use: Yes     Comment: occ. Allergies:  No Known Allergies    Medications:  No current outpatient medications on file. No current facility-administered medications for this visit. Objective:   PE:  /78   Pulse 65   Temp 97.2 °F (36.2 °C) (Temporal)   Ht 5' 9\" (1.753 m)   Wt 198 lb 6.4 oz (90 kg)   SpO2 97%   BMI 29.30 kg/m²   Physical Exam  Vitals and nursing note reviewed. Constitutional:       Appearance: Normal appearance. HENT:      Head: Normocephalic and atraumatic. Right Ear: Tympanic membrane, ear canal and external ear normal.      Left Ear: Tympanic membrane, ear canal and external ear normal.      Nose: Nose normal.      Mouth/Throat:      Mouth: Mucous membranes are moist.   Eyes:      Extraocular Movements: Extraocular movements intact. Pupils: Pupils are equal, round, and reactive to light. Neck:      Vascular: No carotid bruit. Cardiovascular:      Rate and Rhythm: Normal rate and regular rhythm. Pulses: Normal pulses. Heart sounds: Normal heart sounds. No murmur heard. Pulmonary:      Effort: Pulmonary effort is normal.      Breath sounds: Normal breath sounds.    Abdominal:      General: Bowel sounds are normal.      Palpations: Abdomen is soft. Tenderness: There is no abdominal tenderness. There is no guarding. Musculoskeletal:         General: Normal range of motion. Cervical back: Normal range of motion and neck supple. Lymphadenopathy:      Cervical: No cervical adenopathy. Skin:     General: Skin is warm and dry. Neurological:      General: No focal deficit present. Mental Status: He is alert and oriented to person, place, and time. Psychiatric:         Mood and Affect: Mood normal.         Behavior: Behavior normal.          Assessment & Plan   Alejandro Junior was seen today for establish care. Diagnoses and all orders for this visit:    Encounter to establish care    Routine lab draw  -     CBC with Auto Differential; Future  -     Comprehensive Metabolic Panel; Future  -     Lipid, Fasting; Future    Flu vaccine need  -     Influenza, FLUCELVAX, (age 10 mo+), IM, Preservative Free, 0.5 mL    Encouraged to continue healthy lifestyle   Heart healthy diet  Engage in regular exercise daily -30 minutes a day as tolerated  Stay well hydrated - drink at least 64 oz of fluid a day  Eat 6 servings of fruit and vegetables daily  Keep scheduled follow-up appt with me   Call with new concerns     Return in about 6 months (around 7/6/2023) for routine follow-up with me, routine preventative visit. All questions were answered. Medications, including possible adverse effects, and instructions were reviewed and  understanding was confirmed. Follow-up recommendations, including when to contact or return to office (ie; if symptoms worsen or fail to improve), were discussed and acknowledged.     Electronically signed by Tamiko Hughes MD on 1/6/23 at 8:49 AM CST

## 2023-01-06 NOTE — PROGRESS NOTES
After obtaining consent, and per orders of Dr. Jones Balbuena, injection of Flucelvax influenza injection given in Left deltoid by Kayla Reaves. Patient signed consent scanned into patients chart.

## 2023-02-03 ENCOUNTER — PATIENT MESSAGE (OUTPATIENT)
Dept: PRIMARY CARE CLINIC | Age: 43
End: 2023-02-03

## 2023-02-07 NOTE — TELEPHONE ENCOUNTER
Called pt. He states that he gave evens 1/2 the tablet and Rogerio Simeon already called  in the liquid form He states that child felt better after getting the second dose.

## 2023-11-20 ENCOUNTER — OFFICE VISIT (OUTPATIENT)
Dept: PRIMARY CARE CLINIC | Age: 43
End: 2023-11-20
Payer: COMMERCIAL

## 2023-11-20 VITALS
BODY MASS INDEX: 29.65 KG/M2 | HEART RATE: 60 BPM | OXYGEN SATURATION: 91 % | HEIGHT: 69 IN | DIASTOLIC BLOOD PRESSURE: 86 MMHG | SYSTOLIC BLOOD PRESSURE: 126 MMHG | WEIGHT: 200.2 LBS | TEMPERATURE: 97.9 F

## 2023-11-20 DIAGNOSIS — Z00.00 ANNUAL PHYSICAL EXAM: Primary | ICD-10-CM

## 2023-11-20 DIAGNOSIS — Z00.00 ENCOUNTER FOR WELL ADULT EXAM WITHOUT ABNORMAL FINDINGS: ICD-10-CM

## 2023-11-20 DIAGNOSIS — Z13.220 SCREENING FOR HYPERLIPIDEMIA: ICD-10-CM

## 2023-11-20 PROCEDURE — 99396 PREV VISIT EST AGE 40-64: CPT | Performed by: FAMILY MEDICINE

## 2023-11-20 ASSESSMENT — ENCOUNTER SYMPTOMS
WHEEZING: 0
VOMITING: 0
CONSTIPATION: 1
CHEST TIGHTNESS: 0
BACK PAIN: 0
SHORTNESS OF BREATH: 0
ABDOMINAL PAIN: 0
SORE THROAT: 0
DIARRHEA: 0
EYE PAIN: 0
TROUBLE SWALLOWING: 0
NAUSEA: 0
COUGH: 0

## 2023-11-20 NOTE — PATIENT INSTRUCTIONS
We are committed to providing you with the best care possible. In order to help us achieve these goals please remember to bring all medications, herbal products, and over the counter supplements with you to each visit. If your provider has ordered testing for you, please be sure to follow up with our office if you have not received results within 7 days after the testing took place. *If you receive a survey after visiting one of our offices, please take time to share your experience concerning your physician office visit. These surveys are confidential and no health information about you is shared. We are eager to improve for you and we are counting on your feedback to help make that happen. Advance Directives: Care Instructions  Overview  An advance directive is a legal way to state your wishes at the end of your life. It tells your family and your doctor what to do if you can't say what you want. There are two main types of advance directives. You can change them any time your wishes change. Living will. This form tells your family and your doctor your wishes about life support and other treatment. The form is also called a declaration. Medical power of . This form lets you name a person to make treatment decisions for you when you can't speak for yourself. This person is called a health care agent (health care proxy, health care surrogate). The form is also called a durable power of  for health care. If you do not have an advance directive, decisions about your medical care may be made by a family member, or by a doctor or a  who doesn't know you. It may help to think of an advance directive as a gift to the people who care for you. If you have one, they won't have to make tough decisions by themselves. For more information, including forms for your state, see the [address] [place] [address] website (www.caringinfo.org/planning/advance-directives/).   Follow-up care is a key part of

## 2023-11-20 NOTE — PROGRESS NOTES
Well Adult Note  Name: Bayron Patel Date: 2023   MRN: [id number] Sex: Male   Age: 44 y.o. Ethnicity: Non- / Non    : 1980 Race: White (non-)      Charli Huber is here for well adult exam.  History:presents today for his annual physical and routine preventative visit. PMHx reviewed. No significant change. Family and social history also reviewed. No recent ER or UC visit. No recent hospitalization. Chronic medications reviewed, updated and reconciled  Pt does not smoke, drink ETOH or use recreational drugs. He  is NOT up to date on his immunizations. He is due for screening preventative labs   Overall, he feels well and healthy. Review of Systems   Constitutional:  Negative for appetite change, chills, fatigue and fever. HENT:  Negative for congestion, ear pain, hearing loss, nosebleeds, sore throat and trouble swallowing. Eyes:  Negative for pain and visual disturbance. Respiratory:  Negative for cough, chest tightness, shortness of breath and wheezing. Cardiovascular:  Negative for chest pain, palpitations and leg swelling. Gastrointestinal:  Positive for constipation (occ - takes Metamucil). Negative for abdominal pain, diarrhea, nausea and vomiting. Endocrine: Negative for cold intolerance, heat intolerance, polydipsia, polyphagia and polyuria. Genitourinary:  Negative for difficulty urinating, dysuria, frequency, hematuria and urgency. Musculoskeletal:  Negative for arthralgias, back pain, gait problem, joint swelling, myalgias, neck pain and neck stiffness. Skin:  Negative for pallor and rash. Allergic/Immunologic: Positive for environmental allergies. Negative for food allergies. Neurological:  Negative for dizziness, weakness and numbness. Hematological:  Negative for adenopathy. Does not bruise/bleed easily. Psychiatric/Behavioral:  Negative for agitation, behavioral problems, decreased concentration and sleep disturbance.  The

## 2023-11-21 ENCOUNTER — CLINICAL DOCUMENTATION (OUTPATIENT)
Dept: SPIRITUAL SERVICES | Age: 43
End: 2023-11-21

## 2023-11-21 NOTE — ACP (ADVANCE CARE PLANNING)
Outcomes:           []  Additional Outreach -  Date:     (Specify Dates & special circumstances): Outcomes:          Thank you for this referral.

## 2024-06-04 ENCOUNTER — OFFICE VISIT (OUTPATIENT)
Dept: PRIMARY CARE CLINIC | Age: 44
End: 2024-06-04
Payer: COMMERCIAL

## 2024-06-04 VITALS
HEART RATE: 74 BPM | HEIGHT: 69 IN | TEMPERATURE: 98.4 F | SYSTOLIC BLOOD PRESSURE: 110 MMHG | WEIGHT: 194.2 LBS | BODY MASS INDEX: 28.76 KG/M2 | DIASTOLIC BLOOD PRESSURE: 78 MMHG | OXYGEN SATURATION: 97 %

## 2024-06-04 DIAGNOSIS — K52.9 ACUTE GASTROENTERITIS: Primary | ICD-10-CM

## 2024-06-04 DIAGNOSIS — K52.9 ACUTE GASTROENTERITIS: ICD-10-CM

## 2024-06-04 LAB
ADV 40+41 DNA STL QL NAA+NON-PROBE: NOT DETECTED
C CAYETANENSIS DNA STL QL NAA+NON-PROBE: NOT DETECTED
C COLI+JEJ+UPSA DNA STL QL NAA+NON-PROBE: NOT DETECTED
C DIF TOX TCDA+TCDB STL QL NAA+NON-PROBE: NOT DETECTED
CRYPTOSP DNA STL QL NAA+NON-PROBE: NOT DETECTED
E HISTOLYT DNA STL QL NAA+NON-PROBE: NOT DETECTED
EAEC PAA PLAS AGGR+AATA ST NAA+NON-PRB: NOT DETECTED
EC STX1+STX2 GENES STL QL NAA+NON-PROBE: NOT DETECTED
EPEC EAE GENE STL QL NAA+NON-PROBE: NOT DETECTED
ETEC LTA+ST1A+ST1B TOX ST NAA+NON-PROBE: NOT DETECTED
G LAMBLIA DNA STL QL NAA+NON-PROBE: NOT DETECTED
GI PATH DNA+RNA PNL STL NAA+NON-PROBE: NOT DETECTED
NOROVIRUS GI+II RNA STL QL NAA+NON-PROBE: NOT DETECTED
P SHIGELLOIDES DNA STL QL NAA+NON-PROBE: NOT DETECTED
RVA RNA STL QL NAA+NON-PROBE: NOT DETECTED
S ENT+BONG DNA STL QL NAA+NON-PROBE: NOT DETECTED
SAPO I+II+IV+V RNA STL QL NAA+NON-PROBE: DETECTED
SHIGELLA SP+EIEC IPAH ST NAA+NON-PROBE: NOT DETECTED
V CHOL+PARA+VUL DNA STL QL NAA+NON-PROBE: NOT DETECTED
V CHOLERAE DNA STL QL NAA+NON-PROBE: NOT DETECTED
Y ENTEROCOL DNA STL QL NAA+NON-PROBE: NOT DETECTED

## 2024-06-04 PROCEDURE — 99213 OFFICE O/P EST LOW 20 MIN: CPT | Performed by: FAMILY MEDICINE

## 2024-06-04 RX ORDER — SULFAMETHOXAZOLE AND TRIMETHOPRIM 400; 80 MG/1; MG/1
1 TABLET ORAL DAILY
Qty: 7 TABLET | Refills: 0 | Status: SHIPPED | OUTPATIENT
Start: 2024-06-04 | End: 2024-06-11

## 2024-06-04 SDOH — ECONOMIC STABILITY: HOUSING INSECURITY
IN THE LAST 12 MONTHS, WAS THERE A TIME WHEN YOU DID NOT HAVE A STEADY PLACE TO SLEEP OR SLEPT IN A SHELTER (INCLUDING NOW)?: NO

## 2024-06-04 SDOH — ECONOMIC STABILITY: FOOD INSECURITY: WITHIN THE PAST 12 MONTHS, YOU WORRIED THAT YOUR FOOD WOULD RUN OUT BEFORE YOU GOT MONEY TO BUY MORE.: NEVER TRUE

## 2024-06-04 SDOH — ECONOMIC STABILITY: FOOD INSECURITY: WITHIN THE PAST 12 MONTHS, THE FOOD YOU BOUGHT JUST DIDN'T LAST AND YOU DIDN'T HAVE MONEY TO GET MORE.: NEVER TRUE

## 2024-06-04 SDOH — ECONOMIC STABILITY: INCOME INSECURITY: HOW HARD IS IT FOR YOU TO PAY FOR THE VERY BASICS LIKE FOOD, HOUSING, MEDICAL CARE, AND HEATING?: NOT HARD AT ALL

## 2024-06-04 ASSESSMENT — PATIENT HEALTH QUESTIONNAIRE - PHQ9
SUM OF ALL RESPONSES TO PHQ9 QUESTIONS 1 & 2: 0
SUM OF ALL RESPONSES TO PHQ QUESTIONS 1-9: 0
SUM OF ALL RESPONSES TO PHQ QUESTIONS 1-9: 0
1. LITTLE INTEREST OR PLEASURE IN DOING THINGS: NOT AT ALL
2. FEELING DOWN, DEPRESSED OR HOPELESS: NOT AT ALL
SUM OF ALL RESPONSES TO PHQ QUESTIONS 1-9: 0
SUM OF ALL RESPONSES TO PHQ QUESTIONS 1-9: 0

## 2024-06-04 NOTE — PROGRESS NOTES
[facility]  [address]  [address]  [place] [address]  Dept: [phone]  Dept Fax: [phone]  Loc: [phone]      Subjective:     Chief Complaint   Patient presents with    GI Problem     X 1 week     Diarrhea    Emesis       HPI:  Bonifacio Herbret is a 43 y.o. male presents today with above symptoms x 1 week  No fever now but last week he states that he had flu like symptoms. BM described to be watery, non bloody, non mucoid. He had some associated bloating as well. He states that it felt like it was getting better over the weekend and then this morning, he had another bout of diarrhea. He states that his daughter is developing similar symptoms too  He states that he is buying a house and this is making him more stressed than usual.      ROS:   Review of Systems    As noted in HPI    PMHx:  Past Medical History:   Diagnosis Date    Allergic rhinitis      There is no problem list on file for this patient.      PSHx:  Past Surgical History:   Procedure Laterality Date    HERNIA REPAIR Left 2022    ROBOTIC LEFT INGUINAL HERNIA REPAIR performed by Maritza Alvarenga DO at [facility] OR    TONSILLECTOMY         PFHx:  No family history on file.    SocialHx:  Social History     Tobacco Use    Smoking status: Former     Current packs/day: 0.00     Average packs/day: 1 pack/day for 10.0 years (10.0 ttl pk-yrs)     Types: Cigarettes     Start date:      Quit date: 2012     Years since quittin.4    Smokeless tobacco: Never   Substance Use Topics    Alcohol use: Yes     Comment: occ.       Allergies:  No Known Allergies    Medications:  Current Outpatient Medications   Medication Sig Dispense Refill    sulfamethoxazole-trimethoprim (BACTRIM) 400-80 MG per tablet Take 1 tablet by mouth daily for 7 days 7 tablet 0     No current facility-administered medications for this visit.       Objective:   PE:  /78   Pulse 74   Temp 98.4 °F (36.9 °C) (Temporal)

## 2024-06-05 ENCOUNTER — TELEPHONE (OUTPATIENT)
Dept: PRIMARY CARE CLINIC | Age: 44
End: 2024-06-05

## 2024-06-05 NOTE — TELEPHONE ENCOUNTER
----- Message from Dorothy Marion MD sent at 6/5/2024  3:56 PM CDT -----  Please inform pt that stool test came back + for sapovirus usually due to eating something raw. He does not need to take abx that I prescribed for him

## 2024-10-07 ENCOUNTER — OFFICE VISIT (OUTPATIENT)
Dept: PRIMARY CARE CLINIC | Age: 44
End: 2024-10-07
Payer: COMMERCIAL

## 2024-10-07 VITALS
SYSTOLIC BLOOD PRESSURE: 128 MMHG | WEIGHT: 193.2 LBS | HEIGHT: 69 IN | BODY MASS INDEX: 28.61 KG/M2 | OXYGEN SATURATION: 97 % | DIASTOLIC BLOOD PRESSURE: 88 MMHG | TEMPERATURE: 97.8 F | HEART RATE: 81 BPM
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
## 2024-10-07 DIAGNOSIS — S90.862A TICK BITE OF LEFT FOOT, INITIAL ENCOUNTER: Primary | ICD-10-CM

## 2024-10-07 PROCEDURE — 99213 OFFICE O/P EST LOW 20 MIN: CPT | Performed by: FAMILY MEDICINE

## 2024-10-07 RX ORDER — METHYLPREDNISOLONE 4 MG
TABLET, DOSE PACK ORAL
Qty: 1 KIT | Refills: 0 | Status: SHIPPED | OUTPATIENT
Start: 2024-10-07

## 2024-10-07 RX ORDER — DOXYCYCLINE HYCLATE 100 MG
100 TABLET ORAL 2 TIMES DAILY
Qty: 20 TABLET | Refills: 0 | Status: SHIPPED | OUTPATIENT
Start: 2024-10-07 | End: 2024-10-17

## 2024-10-07 NOTE — PROGRESS NOTES
[facility]  [address]  [address]  [place] [address]  Dept: [phone]  Dept Fax: [phone]  Loc: [phone]      Subjective:     Chief Complaint   Patient presents with    Insect Bite     Tick bite week and half ago        HPI:  Bonifacio Herbert is a 44 y.o. male presents with a tick bite in his L foot. It has increased in size   No fever. He feels like he is coming down with something - possibly viral. His daughter just completed abx for strep throat.     ROS:   Review of Systems    PMHx:  Past Medical History:   Diagnosis Date    Allergic rhinitis      There is no problem list on file for this patient.      PSHx:  Past Surgical History:   Procedure Laterality Date    HERNIA REPAIR Left 2022    ROBOTIC LEFT INGUINAL HERNIA REPAIR performed by Maritza Alvarenga DO at [facility] OR    TONSILLECTOMY         PFHx:  No family history on file.    SocialHx:  Social History     Tobacco Use    Smoking status: Former     Current packs/day: 0.00     Average packs/day: 1 pack/day for 10.0 years (10.0 ttl pk-yrs)     Types: Cigarettes     Start date:      Quit date: 2012     Years since quittin.7    Smokeless tobacco: Never   Substance Use Topics    Alcohol use: Yes     Comment: occ.       Allergies:  No Known Allergies    Medications:  Current Outpatient Medications   Medication Sig Dispense Refill    doxycycline hyclate (VIBRA-TABS) 100 MG tablet Take 1 tablet by mouth 2 times daily for 10 days 20 tablet 0    methylPREDNISolone (MEDROL DOSEPACK) 4 MG tablet Take by mouth. 1 kit 0     No current facility-administered medications for this visit.       Objective:   PE:  /88   Pulse 81   Temp 97.8 °F (36.6 °C) (Temporal)   Ht 1.753 m (5' 9\")   Wt 87.6 kg (193 lb 3.2 oz)   SpO2 97%   BMI 28.53 kg/m²   Physical Exam  Skin:     Findings: Lesion (L foot - circumscribed lesion about the size of a dollar coin, well defined border. pinkish but does not look

## (undated) DEVICE — 3M™ IOBAN™ 2 ANTIMICROBIAL INCISE DRAPE 6650EZ: Brand: IOBAN™ 2

## (undated) DEVICE — SOLUTION ANTIFOG VIS SYS CLEARIFY LAPSCP

## (undated) DEVICE — COVER,MAYO STAND,STERILE: Brand: MEDLINE

## (undated) DEVICE — SOLUTION IV IRRIG WATER 1000ML POUR BRL 2F7114

## (undated) DEVICE — SUTURE NONABSORBABLE BRAIDED 2-0 CT-2 1X30 IN ETHBND EXCEL X411H

## (undated) DEVICE — CUFF BLD PRESSURE 1 TUBE AD 25-34 CM ARM VLY FLEXIPORT DISP

## (undated) DEVICE — SUTURE MCRYL SZ 4-0 L18IN ABSRB UD L19MM PS-2 3/8 CIR PRIM Y496G

## (undated) DEVICE — GLOVE SURG SZ 7 CRM LTX FREE POLYISOPRENE POLYMER BEAD ANTI

## (undated) DEVICE — TIP COVER ACCESSORY

## (undated) DEVICE — CATHETER IV 14 GAX2 IN WNG INTROCAN SAFETY

## (undated) DEVICE — SUTURE VCRL SZ 0 L27IN ABSRB VLT L36MM UR-5 5/8 CIR J376H

## (undated) DEVICE — SOLUTION IV IRRIG POUR BRL 0.9% SODIUM CHL 2F7124

## (undated) DEVICE — COVER LT HNDL BLU PLAS

## (undated) DEVICE — GENERAL LAP CDS

## (undated) DEVICE — TOTAL TRAY, 16FR 10ML SIL FOLEY, URN: Brand: MEDLINE

## (undated) DEVICE — CANNULA SEAL

## (undated) DEVICE — ADHESIVE SKIN CLSR 0.7ML TOP DERMBND ADV

## (undated) DEVICE — GOWN,PREVENTION PLUS,XL,ST,24/CS: Brand: MEDLINE

## (undated) DEVICE — STRATAFIX SPRL PDS + 2-0 23CM CT-2

## (undated) DEVICE — GLOVE SURG SZ 6 CRM LTX FREE POLYISOPRENE POLYMER BEAD ANTI

## (undated) DEVICE — ARM DRAPE